# Patient Record
Sex: MALE | Race: WHITE
[De-identification: names, ages, dates, MRNs, and addresses within clinical notes are randomized per-mention and may not be internally consistent; named-entity substitution may affect disease eponyms.]

---

## 2020-10-30 ENCOUNTER — HOSPITAL ENCOUNTER (OUTPATIENT)
Dept: HOSPITAL 75 - RAD | Age: 65
End: 2020-10-30
Attending: FAMILY MEDICINE
Payer: MEDICARE

## 2020-10-30 DIAGNOSIS — M19.072: Primary | ICD-10-CM

## 2020-10-30 PROCEDURE — 73630 X-RAY EXAM OF FOOT: CPT

## 2020-10-30 NOTE — DIAGNOSTIC IMAGING REPORT
HISTORY: Left foot pain.



TECHNIQUE: 3 views of the left foot.



COMPARISON: None



FINDINGS:



No acute fracture or dislocation is seen in the left foot. There

is subtle cortical thickening of the 2nd metatarsal shaft.

Alignment appears normal. There are mild degenerative changes in

the 1st toe metatarsophalangeal joint. No cortical erosions are

seen. There is a small plantar calcaneal enthesophyte. There is a

small os supranaviculare.



IMPRESSION:

1. Subtle cortical thickening of the 2nd metatarsal shaft. If

pain localizes to this region, this could represent a stress

response. MRI could be considered for further evaluation.

2. Mild degenerative change at the 1st MTP joint.



Dictated by: 



  Dictated on workstation # [id number]

## 2020-11-13 ENCOUNTER — HOSPITAL ENCOUNTER (OUTPATIENT)
Dept: HOSPITAL 75 - RAD | Age: 65
End: 2020-11-13
Attending: FAMILY MEDICINE
Payer: MEDICARE

## 2020-11-13 DIAGNOSIS — M77.52: Primary | ICD-10-CM

## 2020-11-13 NOTE — DIAGNOSTIC IMAGING REPORT
PROCEDURE: MR imaging left lower extremity without contrast.



TECHNIQUE: Multiplanar, multisequence non contrast enhanced MR

imaging of the left forefoot was accomplished.



INDICATION: Pain in left foot.



FINDINGS:



Bones: No bone marrow edema within the phalanges or metatarsals

to indicate fracture. No marrow replacing process, osteitis or

erosions.



Soft tissues: There is small amount of fluid in the first and

second intermetatarsal spaces that could represent mild bursitis.

No intermetatarsal mass to suggest neuroma. The flexor digitorum

brevis and longus tendons in the second toe are intact. No

nodular thickening of plantar fascia to indicate fibromatosis.

Lisfranc ligamentous complex is intact.



IMPRESSION:

1. Bursitis is present in the first and second intermetatarsal

spaces.

2. No Salazar's neuroma.

3. No fracture or stress fracture.



Dictated by: 



  Dictated on workstation # [id number]

## 2021-08-04 ENCOUNTER — HOSPITAL ENCOUNTER (OUTPATIENT)
Dept: HOSPITAL 75 - REHAB | Age: 66
LOS: 67 days | Discharge: HOME | End: 2021-10-10
Attending: FAMILY MEDICINE
Payer: MEDICARE

## 2021-08-04 DIAGNOSIS — K21.9: ICD-10-CM

## 2021-08-04 DIAGNOSIS — M25.511: Primary | ICD-10-CM
